# Patient Record
Sex: FEMALE | Race: BLACK OR AFRICAN AMERICAN | Employment: UNEMPLOYED | URBAN - METROPOLITAN AREA
[De-identification: names, ages, dates, MRNs, and addresses within clinical notes are randomized per-mention and may not be internally consistent; named-entity substitution may affect disease eponyms.]

---

## 2018-10-16 ENCOUNTER — OFFICE VISIT (OUTPATIENT)
Dept: FAMILY MEDICINE | Facility: CLINIC | Age: 38
End: 2018-10-16
Payer: MEDICAID

## 2018-10-16 VITALS
DIASTOLIC BLOOD PRESSURE: 69 MMHG | SYSTOLIC BLOOD PRESSURE: 96 MMHG | BODY MASS INDEX: 33.8 KG/M2 | HEIGHT: 64 IN | TEMPERATURE: 98.6 F | WEIGHT: 198 LBS | HEART RATE: 73 BPM | OXYGEN SATURATION: 100 %

## 2018-10-16 DIAGNOSIS — M54.42 ACUTE LEFT-SIDED LOW BACK PAIN WITH LEFT-SIDED SCIATICA: Primary | ICD-10-CM

## 2018-10-16 PROCEDURE — 99213 OFFICE O/P EST LOW 20 MIN: CPT | Performed by: NURSE PRACTITIONER

## 2018-10-16 PROCEDURE — T1013 SIGN LANG/ORAL INTERPRETER: HCPCS | Mod: U3 | Performed by: NURSE PRACTITIONER

## 2018-10-16 RX ORDER — METHYLPREDNISOLONE 4 MG
TABLET, DOSE PACK ORAL
Qty: 21 TABLET | Refills: 0 | Status: SHIPPED | OUTPATIENT
Start: 2018-10-16

## 2018-10-16 RX ORDER — ACETAMINOPHEN 500 MG
1000 TABLET ORAL EVERY 8 HOURS PRN
Qty: 100 TABLET | Refills: 0 | Status: SHIPPED | OUTPATIENT
Start: 2018-10-16

## 2018-10-16 ASSESSMENT — PAIN SCALES - GENERAL: PAINLEVEL: SEVERE PAIN (6)

## 2018-10-16 NOTE — PROGRESS NOTES
SUBJECTIVE:   Ivania Black is a 38 year old female who presents to clinic today for the following health issues:    Due to language barrier, an  was present during the history-taking and subsequent discussion (and for part of the physical exam) with this patient.      Joint Pain    Onset: 1 1/2 weeks    Description:   Location: left hip  Character: Sharp    Intensity: moderateto severe    Progression of Symptoms: better    Accompanying Signs & Symptoms:  Other symptoms: none    History:   Previous similar pain: no       Precipitating factors:   Trauma or overuse: YES- while brushing her teeth she felt a pop in her left hip area    Alleviating factors:  Improved by: rest/inactivity, mainly lying down    Therapies Tried and outcome:     Brushing her teeth 10 days ago and developed pain in left low back and radiates to left leg. Left buttocks  Relieved when laying down  Worse with sitting  Denies paresthesias, weakness  Denies B/B incontinence  No previous history of back pain    She was seen at ED 10/8/18 in [place]  Not sure where  (Nothing available in CareEverywhere)  Was given Flexeril which has not been helpful  No imaging  She was also given ibuprofen which helps sometimes but not when the pain is severe  Last took ibuprofen yesterday noon      Problem list and histories reviewed & adjusted, as indicated.  Additional history: none    There is no problem list on file for this patient.    History reviewed. No pertinent surgical history.    Social History   Substance Use Topics     Smoking status: Never Smoker     Smokeless tobacco: Never Used     Alcohol use No     History reviewed. No pertinent family history.        Reviewed and updated as needed this visit by clinical staff  Tobacco  Meds  Med Hx  Surg Hx  Fam Hx  Soc Hx      Reviewed and updated as needed this visit by Provider         ROS:  Constitutional, HEENT, cardiovascular, pulmonary, gi and gu systems are negative, except as  "otherwise noted.    OBJECTIVE:     BP 96/69 (BP Location: Right arm, Patient Position: Chair, Cuff Size: Adult Regular)  Pulse 73  Temp 98.6  F (37  C) (Oral)  Ht 5' 4.25\" (1.632 m)  Wt 198 lb (89.8 kg)  LMP 09/25/2018  SpO2 100%  Breastfeeding? No  BMI 33.72 kg/m2  Body mass index is 33.72 kg/(m^2).  GENERAL: healthy, alert and no distress  MS: Gait slow but appropriate. No difficulty sitting to standing. No tenderness to palpation lumbar spinous processes, bilateral SI joints. Tenderness left lumbar region without guarding. Able to lay supine and go to standing without difficulty. Unable to straight leg raise bilaterally d/t pain  SKIN: no suspicious lesions or rashes  NEURO: Normal strength and tone, mentation intact and speech normal    Diagnostic Test Results:  none     ASSESSMENT/PLAN:       ICD-10-CM    1. Acute left-sided low back pain with left-sided sciatica M54.42 methylPREDNISolone (MEDROL DOSEPAK) 4 MG tablet     acetaminophen (TYLENOL) 500 MG tablet     [facility] REFERR[facility]       Physical exam was somewhat difficult. May have been d/t language barrier and patient not participating or was she uncomfortable with male ?   No injury or neurological deficits to warrant imaging today  Recommend scheduled use of higher dose ibuprofen but she does not think that is sufficient  Therefore, will treat with short steroid burst. Reviewed risks and benefits  And recommend concurrent scheduled use of tylenol  Recommend ice, regular physical activity, no heavy lifting and if not improving over the next 2 weeks, call to schedule physical therapy       MAY Gaines [facility]  "

## 2018-10-16 NOTE — MR AVS SNAPSHOT
After Visit Summary   10/16/2018    Ivania Black    MRN: [id number]           Patient Information     Date Of Birth          1980        Visit Information        Provider Department      10/16/2018 5:35 PM Zaynab Levin APRN CNP; LANGUAGE BANC [facility]        Today's Diagnoses     Acute left-sided low back pain with left-sided sciatica    -  1       Follow-ups after your visit        Additional Services     [facility] REFERRAL       Physical Therapy, Hand Therapy and Chiropractic Care are available through:  *[facility]  *Hand Therapy (Occupational Therapy or Physical Therapy)  *[facility]    Call one number to schedule at any of the above locations: [phone].    Physical therapy, Hand therapy and/or Chiropractic care has been recommended by your physician as an excellent treatment option to reduce pain and help people return to normal activities, including sports.  Therapy and/or chiropractic care services are a great complement or alternative to expensive and invasive surgery, injections, or long-term use of prescription medications. The primary goal is to identify the underlying problem and provide you the tools to manage your condition on your own.     Please be aware that coverage of these services is subject to the terms and limitations of your health insurance plan.  Call member services at your health plan with any benefit or coverage questions.      Please bring the following to your appointment:  *Your personal calendar for scheduling future appointments  *Comfortable clothing                  Future tests that were ordered for you today     Open Future Orders        Priority Expected Expires Ordered    [facility] REFERRAL Routine  10/16/2019 10/16/2018            Who to contact     If you have questions or need follow up information about today's clinic visit or  "your schedule please contact [facility] directly at [phone].  Normal or non-critical lab and imaging results will be communicated to you by MyChart, letter or phone within 4 business days after the clinic has received the results. If you do not hear from us within 7 days, please contact the clinic through MyChart or phone. If you have a critical or abnormal lab result, we will notify you by phone as soon as possible.  Submit refill requests through [employer] or call your pharmacy and they will forward the refill request to us. Please allow 3 business days for your refill to be completed.          Additional Information About Your Visit        Care EveryWhere ID     This is your Care EveryWhere ID. This could be used by other organizations to access your [facility] medical records  [id number]        Your Vitals Were     Pulse Temperature Height Last Period Pulse Oximetry Breastfeeding?    73 98.6  F (37  C) (Oral) 5' 4.25\" (1.632 m) 09/25/2018 100% No    BMI (Body Mass Index)                   33.72 kg/m2            Blood Pressure from Last 3 Encounters:   10/16/18 96/69    Weight from Last 3 Encounters:   10/16/18 198 lb (89.8 kg)                 Today's Medication Changes          These changes are accurate as of 10/16/18  6:26 PM.  If you have any questions, ask your nurse or doctor.               Start taking these medicines.        Dose/Directions    acetaminophen 500 MG tablet   Commonly known as:  TYLENOL   Used for:  Acute left-sided low back pain with left-sided sciatica   Started by:  Zaynab Levin APRN CNP        Dose:  1000 mg   Take 2 tablets (1,000 mg) by mouth every 8 hours as needed for mild pain   Quantity:  100 tablet   Refills:  0       methylPREDNISolone 4 MG tablet   Commonly known as:  MEDROL DOSEPAK   Used for:  Acute left-sided low back pain with left-sided sciatica   Started by:  Zaynab Levin APRN CNP        Follow package instructions "   Quantity:  21 tablet   Refills:  0            Where to get your medicines      Some of these will need a paper prescription and others can be bought over the counter.  Ask your nurse if you have questions.     Bring a paper prescription for each of these medications     acetaminophen 500 MG tablet    methylPREDNISolone 4 MG tablet                Primary Care Provider Office Phone # Fax #    [facility] [phone]       [address] [place] [id number]        Equal Access to Services     IRAIS NUNEZ : Kiet robles hadasho Soomaali, waaxda luqadaha, qaybta kaalmada adeegyada, waxalan guardado hayyakovn vianca gibbsnathaliekeyur koenig. So [facility] [phone].    ATENCIÓN: Si rosala espmarlene, tiene a saunders disposición servicios gratuitos de asistencia lingüística. Darvin al [phone].    We comply with applicable federal civil rights laws and [place] laws. We do not discriminate on the basis of race, color, national origin, age, disability, sex, sexual orientation, or gender identity.            Thank you!     Thank you for choosing [facility]  for your care. Our goal is always to provide you with excellent care. Hearing back from our patients is one way we can continue to improve our services. Please take a few minutes to complete the written survey that you may receive in the mail after your visit with us. Thank you!             Your Updated Medication List - Protect others around you: Learn how to safely use, store and throw away your medicines at www.disposemymeds.org.          This list is accurate as of 10/16/18  6:26 PM.  Always use your most recent med list.                   Brand Name Dispense Instructions for use Diagnosis    acetaminophen 500 MG tablet    TYLENOL    100 tablet    Take 2 tablets (1,000 mg) by mouth every 8 hours as needed for mild pain    Acute left-sided low back pain with left-sided sciatica       CYCLOBENZAPRINE HCL PO      Take 10  mg by mouth 3 times daily        methylPREDNISolone 4 MG tablet    MEDROL DOSEPAK    21 tablet    Follow package instructions    Acute left-sided low back pain with left-sided sciatica

## 2018-10-19 ENCOUNTER — TELEPHONE (OUTPATIENT)
Dept: FAMILY MEDICINE | Facility: CLINIC | Age: 38
End: 2018-10-19

## 2018-10-19 NOTE — LETTER
October 19, 2018      Ivania Black  [address] [address]        To Whom It May Concern:    Ivania Black  was seen on 10/16/2018.  Please excuse her from work from October 15 - October 19, 2018 due to illness.        Sincerely,        Kwadwo Kelly MD/cn

## 2018-10-19 NOTE — LETTER
[address] [address]  Phone: [phone]  Fax: [phone]    October 19, 2018        Ivania Black  [address] [address]          To whom it may concern:    RE: Ivania Black    The patient was seen in our office on October 16 for back pain.  She missed work from October 15-19 because of this.    Thank you for your consideration.      Sincerely,        MAY Gaines CNP

## 2018-10-22 NOTE — TELEPHONE ENCOUNTER
TC attempted to reach a Portuguese  and there was not one that was available at this time. TC attempted to contact sister at number provided and the phone is not taking incoming phone calls.

## 2021-03-25 ENCOUNTER — OFFICE VISIT (OUTPATIENT)
Dept: OPHTHALMOLOGY | Facility: CLINIC | Age: 41
End: 2021-03-25
Payer: COMMERCIAL

## 2021-03-25 DIAGNOSIS — H10.13 ALLERGIC CONJUNCTIVITIS OF BOTH EYES: ICD-10-CM

## 2021-03-25 DIAGNOSIS — H52.4 PRESBYOPIA: Primary | ICD-10-CM

## 2021-03-25 PROCEDURE — 92012 INTRM OPH EXAM EST PATIENT: CPT | Performed by: OPTOMETRIST

## 2021-03-25 RX ORDER — OLOPATADINE HYDROCHLORIDE 2 MG/ML
1 SOLUTION/ DROPS OPHTHALMIC DAILY
Qty: 2.5 ML | Refills: 4 | Status: SHIPPED | OUTPATIENT
Start: 2021-03-25

## 2021-03-25 ASSESSMENT — VISUAL ACUITY
OD_SC: 20/20
OS_SC+: -1
METHOD: SNELLEN - LINEAR
OD_SC: J5
OS_SC: J5
OS_SC: 20/20

## 2021-03-25 ASSESSMENT — TONOMETRY
IOP_METHOD: ICARE
OD_IOP_MMHG: 15
OS_IOP_MMHG: 16

## 2021-03-25 ASSESSMENT — SLIT LAMP EXAM - LIDS
COMMENTS: NORMAL
COMMENTS: NORMAL

## 2021-03-25 ASSESSMENT — CONF VISUAL FIELD
METHOD: COUNTING FINGERS
OS_NORMAL: 1
OD_NORMAL: 1

## 2021-03-25 ASSESSMENT — REFRACTION_MANIFEST
OD_SPHERE: PLANO
OS_CYLINDER: SPHERE
OS_SPHERE: +0.25
OS_ADD: +1.50
OD_CYLINDER: SPHERE
OD_ADD: +1.50

## 2021-03-25 ASSESSMENT — CUP TO DISC RATIO
OD_RATIO: 0.30
OS_RATIO: 0.30

## 2021-03-25 NOTE — NURSING NOTE
Chief Complaints and History of Present Illnesses   Patient presents with     COMPREHENSIVE EYE EXAM     Vision is down with OU     Chief Complaint(s) and History of Present Illness(es)     COMPREHENSIVE EYE EXAM     Laterality: both eyes    Associated symptoms: tearing.  Negative for eye pain    Comments: Vision is down with OU              Comments     Notices near vision changes with each eye. Hard to see text messages on phone the past year the past 1+ year.   Itchy, watery each eye for several weeks with each eye. Denies any mattering.     Had a exam in 2019 and could not tollerate the dilating drops caused her pain and would like to defer.     Dinora Martinez, COT COT 2:36 PM March 25, 2021

## 2021-03-25 NOTE — PROGRESS NOTES
A/P  1.) Presbyopia each eye  -Emmetropic at distance, needs presbyopic correction only  -Option for OTC readers or PAL's if desired. Reviewed either okay to use depending on personal preference  -She reports itchy eyes occasionally, can use Pataday every day or Zaditor bid prn. Pt requested Rx be sent, reviewed also OTC  -Undilated ocular health unremarkable each eye    I have confirmed the patient's CC, HPI and reviewed Past Medical History, Past Surgical History, Social History, Family History, Problem List, Medication List and agree with Tech note.     Keisha Santos, ROBBIN FAAO FSLS

## 2021-10-06 ENCOUNTER — HOSPITAL ENCOUNTER (EMERGENCY)
Facility: CLINIC | Age: 41
Discharge: LEFT BEFORE DISCHARGE | End: 2021-10-06
Payer: COMMERCIAL

## 2021-10-06 VITALS
SYSTOLIC BLOOD PRESSURE: 129 MMHG | HEART RATE: 70 BPM | DIASTOLIC BLOOD PRESSURE: 77 MMHG | RESPIRATION RATE: 16 BRPM | OXYGEN SATURATION: 99 % | WEIGHT: 194.3 LBS | TEMPERATURE: 98 F | BODY MASS INDEX: 33.09 KG/M2

## 2025-04-14 ENCOUNTER — APPOINTMENT (OUTPATIENT)
Dept: ULTRASOUND IMAGING | Facility: CLINIC | Age: 45
End: 2025-04-14
Attending: PHYSICIAN ASSISTANT
Payer: COMMERCIAL

## 2025-04-14 ENCOUNTER — HOSPITAL ENCOUNTER (EMERGENCY)
Facility: CLINIC | Age: 45
Discharge: HOME | End: 2025-04-14
Attending: PHYSICIAN ASSISTANT | Admitting: PHYSICIAN ASSISTANT
Payer: COMMERCIAL

## 2025-04-14 ENCOUNTER — APPOINTMENT (OUTPATIENT)
Dept: GENERAL RADIOLOGY | Facility: CLINIC | Age: 45
End: 2025-04-14
Attending: PHYSICIAN ASSISTANT
Payer: COMMERCIAL

## 2025-04-14 VITALS
OXYGEN SATURATION: 100 % | RESPIRATION RATE: 18 BRPM | TEMPERATURE: 97.1 F | HEART RATE: 65 BPM | DIASTOLIC BLOOD PRESSURE: 76 MMHG | SYSTOLIC BLOOD PRESSURE: 117 MMHG

## 2025-04-14 DIAGNOSIS — M54.41 RIGHT-SIDED LOW BACK PAIN WITH RIGHT-SIDED SCIATICA, UNSPECIFIED CHRONICITY: ICD-10-CM

## 2025-04-14 DIAGNOSIS — M25.561 RIGHT KNEE PAIN, UNSPECIFIED CHRONICITY: ICD-10-CM

## 2025-04-14 PROCEDURE — 250N000013 HC RX MED GY IP 250 OP 250 PS 637: Performed by: PHYSICIAN ASSISTANT

## 2025-04-14 PROCEDURE — 93971 EXTREMITY STUDY: CPT | Mod: RT

## 2025-04-14 PROCEDURE — 99285 EMERGENCY DEPT VISIT HI MDM: CPT | Mod: 25

## 2025-04-14 PROCEDURE — 250N000011 HC RX IP 250 OP 636: Mod: JZ | Performed by: PHYSICIAN ASSISTANT

## 2025-04-14 PROCEDURE — 96372 THER/PROPH/DIAG INJ SC/IM: CPT | Performed by: PHYSICIAN ASSISTANT

## 2025-04-14 PROCEDURE — 73562 X-RAY EXAM OF KNEE 3: CPT | Mod: RT

## 2025-04-14 RX ORDER — METHYLPREDNISOLONE 4 MG/1
TABLET ORAL
Qty: 21 TABLET | Refills: 0 | Status: SHIPPED | OUTPATIENT
Start: 2025-04-14

## 2025-04-14 RX ORDER — CYCLOBENZAPRINE HCL 10 MG
10 TABLET ORAL 3 TIMES DAILY PRN
Qty: 12 TABLET | Refills: 0 | Status: SHIPPED | OUTPATIENT
Start: 2025-04-14

## 2025-04-14 RX ORDER — IBUPROFEN 600 MG/1
600 TABLET, FILM COATED ORAL EVERY 6 HOURS PRN
Qty: 20 TABLET | Refills: 0 | Status: SHIPPED | OUTPATIENT
Start: 2025-04-14

## 2025-04-14 RX ORDER — ACETAMINOPHEN 500 MG
1000 TABLET ORAL ONCE
Status: COMPLETED | OUTPATIENT
Start: 2025-04-14 | End: 2025-04-14

## 2025-04-14 RX ORDER — KETOROLAC TROMETHAMINE 15 MG/ML
15 INJECTION, SOLUTION INTRAMUSCULAR; INTRAVENOUS ONCE
Status: COMPLETED | OUTPATIENT
Start: 2025-04-14 | End: 2025-04-14

## 2025-04-14 RX ORDER — CYCLOBENZAPRINE HCL 10 MG
10 TABLET ORAL ONCE
Status: COMPLETED | OUTPATIENT
Start: 2025-04-14 | End: 2025-04-14

## 2025-04-14 RX ADMIN — ACETAMINOPHEN 1000 MG: 500 TABLET ORAL at 14:54

## 2025-04-14 RX ADMIN — CYCLOBENZAPRINE 10 MG: 10 TABLET, FILM COATED ORAL at 14:54

## 2025-04-14 RX ADMIN — KETOROLAC TROMETHAMINE 15 MG: 15 INJECTION, SOLUTION INTRAMUSCULAR; INTRAVENOUS at 14:54

## 2025-04-14 ASSESSMENT — ACTIVITIES OF DAILY LIVING (ADL)
ADLS_ACUITY_SCORE: 41

## 2025-04-14 NOTE — DISCHARGE INSTRUCTIONS
Your imaging is reassuring today. Continue supportive cares including rest, limit lifting over 10 lbs, and close follow-up with primary care. Work note is provided. Return to ED with any new or worsening symptoms.

## 2025-04-14 NOTE — Clinical Note
Ivania Black was seen and treated in our emergency department on 4/14/2025.  She may return to work on 04/16/2025.       If you have any questions or concerns, please don't hesitate to call.      Yasemin Walter PA-C

## 2025-04-14 NOTE — ED PROVIDER NOTES
Emergency Department Note      History of Present Illness     Chief Complaint   Leg Pain      HPI Tristanian  used  Ivania Black is a 45 year old female with a history of lumbar herniated disc, chronic back pain s/p discectomy who presents with leg pain and back pain. For the past 2 months the patient has been experiencing back pain that radiates into her right leg along with right knee pain that radiates down into her foot. The pain is worse with movement and feels similar to the pain she had prior to her back surgery in 2021. She has been taking over the counter pain medicine but doesn't feel it is working. She denies any history of blood clots.     Independent Historian   None    Review of External Notes   I reviewed the MRI results from 5/11/2021 with impression below.   1. Postoperative changes of left hemilaminectomy and discectomy at L5-S1. Improvement in left S1 nerve root impingement with enhancing granulation tissue adjacent to left S1 nerve root. Mild bilateral foraminal narrowing.   2. Mild degenerative disc and facet changes L2-3 and L4-5 without significant stenosis at these levels.     Past Medical History     Medical History and Problem List   GERD  Lumbar herniated disc   chronic back pain s/p discectomy    Medications   The patient is not taking any routine medications     Surgical History   Lumbar laminectomy and microdiscectomy     Physical Exam     Patient Vitals for the past 24 hrs:   BP Temp Temp src Pulse Resp SpO2   04/14/25 1408 117/76 97.1  F (36.2  C) Temporal 65 18 100 %     Physical Exam  Constitutional: Alert, attentive, GCS 15  HENT:    Nose: Nose normal.    Mouth/Throat: Oropharynx is clear, mucous membranes are moist   Eyes: EOM are normal. Pupils equal and reactive.  Neck: Normal range of motion. No rigidity.  CV: regular rate and rhythm; no murmurs, rubs or gallups  Chest: Effort normal and breath sounds normal.   GI:  There is no tenderness. No distension. Normal bowel  sounds  MSK: Normal range of motion.   Neurological: Alert, attentive, Oriented x 3. No facial asymmetry. CN II-XII intact. 5/5 strength in upper and lower extremities. Normal range of motion in all four extremities. Distal sensation in hands and feet intact. Normal gait.  Positive straight leg raise on right leg.  Skin: Skin is warm and dry.      Diagnostics     Lab Results   Labs Ordered and Resulted from Time of ED Arrival to Time of ED Departure - No data to display    Imaging   US Lower Extremity Venous Duplex Right   Final Result   IMPRESSION:   1.  No deep venous thrombosis in the right lower extremity.      XR Knee Right 3 Views   Final Result   IMPRESSION: No acute displaced fracture or subluxation. Mild patellofemoral compartment right knee arthrosis. No right knee joint effusion.          EKG   None     Independent Interpretation   None    ED Course      Medications Administered   Medications   ketorolac (TORADOL) injection 15 mg (15 mg Intramuscular $Given 4/14/25 1454)   acetaminophen (TYLENOL) tablet 1,000 mg (1,000 mg Oral $Given 4/14/25 1454)   cyclobenzaprine (FLEXERIL) tablet 10 mg (10 mg Oral $Given 4/14/25 1454)       Procedures   Procedures     Discussion of Management   None    ED Course   ED Course as of 04/14/25 2300   Mon Apr 14, 2025   1422 I initially assessed the patient and obtained the above history and physical exam.    1611 I rechecked the patient      Additional Documentation  None    Medical Decision Making / Diagnosis     CMS Diagnoses: None    MIPS       None    [facility]   Ivania Black is a 45 year old female with chronic lumbar back pain s/p lumbar discectomy who presents with low back pain and right knee pain for past 2 months.  She is afebrile, normotensive, nonhypoxic.  Physical examination reveals bilateral lower extremities are neurovascularly intact.  There is a positive straight leg raise on the right side suspicious for sciatica.  There have been no red flag symptoms such as  fevers, motor weakness, saddle anesthesia, urinary or bowel retention/incontinence, or history of cancer.  Given evidence of right knee pain, x-ray of the right knee obtained shows no evidence of acute fracture.  Ultrasound of the right lower extremity reveals no DVT.  No evidence of septic joint.  I do suspect she has acute on chronic low back pain with radicular symptoms.  She will be started on oral steroid pack, muscle relaxants, and ibuprofen.  She requests 2 to 3 weeks off work however cautioned that I am unable to do this given there is no follow-up with the emergency department.  She will need primary care follow-up and a referral was placed.  She may also contact her prior spinal surgeon for further evaluation.  No evidence of emergent neurological process such as cauda equina, discitis, transverse myelitis, or epidural abscess and advanced imaging such as MRI is not indicated. Recommend follow-up with primary care and returned precautions discussed including red flags as above. She is discharged home.    Disposition   The patient was discharged.     Diagnosis     ICD-10-CM    1. Right-sided low back pain with right-sided sciatica, unspecified chronicity  M54.41 Primary Care Referral      2. Right knee pain, unspecified chronicity  M25.561            Discharge Medications   Discharge Medication List as of 4/14/2025  5:02 PM        START taking these medications    Details   ibuprofen (ADVIL/MOTRIN) 600 MG tablet Take 1 tablet (600 mg) by mouth every 6 hours as needed for moderate pain., Disp-20 tablet, R-0, Local Print           Scribe Disclosure:  I, Jeff Moreno, am serving as a scribe at 2:22 PM on 4/14/2025 to document services personally performed by Yasemin Walter PA-C based on my observations and the provider's statements to me.        Yasemin Walter PA-C  04/14/25 [id number]

## 2025-04-14 NOTE — Clinical Note
Ivania Black was seen and treated in our emergency department on 4/14/2025.  She may return to work on 04/16/2025.  Light duty until seen by primary care.     If you have any questions or concerns, please don't hesitate to call.      Yasemin Walter PA-C

## 2025-04-14 NOTE — ED TRIAGE NOTES
While using  pt expresses 3 days of lower right leg pain, states it is preventing her from standing, walking and sleeping. She states the pain began when she was at work and lifted something heavy and felt severe pain at the knee down to the ankle. Now she reports that she also has pain up into the thigh. The pt describes the pain as a lightening bolt, also says she has had this pain before years past.

## 2025-04-14 NOTE — ED NOTES
Pt to D/C to home.  Pt provided with d/c instructions, including new medications, when medications were last given, and when to take them again.  Pt also informed to f/u with PCP.  Pt verbalized understanding of all d/c and f/u instructions.  All questions were answered at this time.  Copy of paperwork sent with pt.